# Patient Record
Sex: MALE | Race: WHITE | Employment: OTHER | URBAN - METROPOLITAN AREA
[De-identification: names, ages, dates, MRNs, and addresses within clinical notes are randomized per-mention and may not be internally consistent; named-entity substitution may affect disease eponyms.]

---

## 2022-05-27 ENCOUNTER — EMERGENCY VISIT (OUTPATIENT)
Dept: URBAN - METROPOLITAN AREA CLINIC 43 | Facility: CLINIC | Age: 27
End: 2022-05-27

## 2022-05-27 DIAGNOSIS — T15.02XA: ICD-10-CM

## 2022-05-27 PROCEDURE — 92071 CONTACT LENS FITTING FOR TX: CPT

## 2022-05-27 PROCEDURE — 92002 INTRM OPH EXAM NEW PATIENT: CPT

## 2022-05-27 PROCEDURE — 65222 REMOVE FOREIGN BODY FROM EYE: CPT

## 2022-05-27 RX ORDER — MOXIFLOXACIN OPHTHALMIC 5 MG/ML
1 SOLUTION/ DROPS OPHTHALMIC
Start: 2022-05-27

## 2022-05-27 ASSESSMENT — VISUAL ACUITY
OD_SC: 20/20
OS_SC: 20/20

## 2022-05-27 ASSESSMENT — TONOMETRY: OD_IOP_MMHG: 12

## 2022-05-27 NOTE — PROCEDURE NOTE: CLINICAL
PROCEDURE NOTE: Removal of Corneal FB at Slit Lamp #1 OS. Diagnosis: Corneal Foreign Body. Anesthesia: Topical. The patient, the procedure, and the correct site were identified initially. Prior to treatment, the risks/benefits/alternatives were discussed. The patient wished to proceed with procedure. Corneal foreign body was removed using a 25 gauge needle at the slit lamp. Patient tolerated procedure well. There were no complications. Post-op instructions given. The residual rust ring was carefully buffed out of the corneal stroma. Patient tolerated procedure well. Chloe Rust PROCEDURE NOTE: Bandage Contact Lens #1 OS. Diagnosis: Corneal Foreign Body. A therapeutic soft contact lens of the of the appropriate size and base curve was selected and then applied to the cornea. The lens fit well and moved appropriately. Chloe Rust

## 2022-06-02 ENCOUNTER — FOLLOW UP (OUTPATIENT)
Dept: URBAN - METROPOLITAN AREA CLINIC 46 | Facility: CLINIC | Age: 27
End: 2022-06-02

## 2022-06-02 DIAGNOSIS — T15.02XD: ICD-10-CM

## 2022-06-02 PROCEDURE — 92012 INTRM OPH EXAM EST PATIENT: CPT

## 2022-06-02 ASSESSMENT — VISUAL ACUITY: OD_SC: 20/20-1

## 2022-06-02 NOTE — PATIENT DISCUSSION
removed BCL today and residual rust ring removed with gayle brush. Cont. vigamox QID x 5 more days then stop.